# Patient Record
Sex: MALE
[De-identification: names, ages, dates, MRNs, and addresses within clinical notes are randomized per-mention and may not be internally consistent; named-entity substitution may affect disease eponyms.]

---

## 2019-02-13 ENCOUNTER — HOSPITAL ENCOUNTER (OUTPATIENT)
Dept: HOSPITAL 31 - C.ENDO | Age: 69
Discharge: HOME | End: 2019-02-13
Attending: INTERNAL MEDICINE
Payer: MEDICARE

## 2019-02-13 VITALS — RESPIRATION RATE: 14 BRPM | SYSTOLIC BLOOD PRESSURE: 115 MMHG | DIASTOLIC BLOOD PRESSURE: 78 MMHG | HEART RATE: 66 BPM

## 2019-02-13 VITALS — OXYGEN SATURATION: 99 %

## 2019-02-13 VITALS — TEMPERATURE: 97.8 F

## 2019-02-13 VITALS — BODY MASS INDEX: 34.4 KG/M2

## 2019-02-13 DIAGNOSIS — I10: ICD-10-CM

## 2019-02-13 DIAGNOSIS — K21.0: Primary | ICD-10-CM

## 2019-02-13 DIAGNOSIS — K29.50: ICD-10-CM

## 2019-02-13 DIAGNOSIS — M19.90: ICD-10-CM

## 2019-02-13 DIAGNOSIS — K44.9: ICD-10-CM

## 2019-02-13 PROCEDURE — 43239 EGD BIOPSY SINGLE/MULTIPLE: CPT

## 2019-02-13 PROCEDURE — 88342 IMHCHEM/IMCYTCHM 1ST ANTB: CPT

## 2019-02-13 PROCEDURE — 88305 TISSUE EXAM BY PATHOLOGIST: CPT

## 2019-02-13 PROCEDURE — 88312 SPECIAL STAINS GROUP 1: CPT

## 2019-02-13 NOTE — CP.SDSHP
[facility] Day [facility]





- History


Proposed Procedure: Egd


Pre-Op Diagnosis: epigastric pain.  heartburn refractory to therapy





- Previous Medical/Surgical History


Cardiac: Hypertension


Neuro: Backaches


Misc: Other (DJD, gastritis, gerd, hiatal hernia, herniated disc)


Pain: 2.Mild Pain


Previous Surgical History: appendix.  L/S disc fusion





- Allergies


Allergies: 


Allergies





aspirin Allergy (Severe, Verified 02/13/19 07:14)


   SWELLING


   RASH 











- Physical Exam


Vital Signs: 


                                   Vital Signs











  02/13/19 02/13/19





  07:15 07:51


 


Temperature 97.8 F 


 


Pulse Rate 73 73


 


Respiratory 19 





Rate  


 


Blood Pressure 118/69 


 


O2 Sat by Pulse 98 





Oximetry  











Mental Status: Alert & Oriented x3


Neuro: WNL


Heart: WNL


Lungs: WNL


GI: WNL





- Impression


Impression: epigastric pain.  heartburn refractory to therapy


Pt. Evaluated Today:Candidate for Anesthesia & Procedure: Yes





- Date & Time


Date: 02/13/19


Time: 08:42





Short Stay Discharge





- Short Stay Discharge


Admitting Diagnosis/Reason for Visit: EPIGASTRIC PAIN,HEARTBURN


Disposition: HOME/ ROUTINE